# Patient Record
Sex: FEMALE | Race: WHITE | URBAN - METROPOLITAN AREA
[De-identification: names, ages, dates, MRNs, and addresses within clinical notes are randomized per-mention and may not be internally consistent; named-entity substitution may affect disease eponyms.]

---

## 2019-12-26 ENCOUNTER — OFFICE VISIT (OUTPATIENT)
Dept: OBGYN CLINIC | Age: 54
End: 2019-12-26

## 2019-12-26 VITALS
BODY MASS INDEX: 23.64 KG/M2 | WEIGHT: 156 LBS | SYSTOLIC BLOOD PRESSURE: 142 MMHG | HEIGHT: 68 IN | DIASTOLIC BLOOD PRESSURE: 70 MMHG

## 2019-12-26 DIAGNOSIS — L72.3 SEBACEOUS CYST: Primary | ICD-10-CM

## 2019-12-26 RX ORDER — CITALOPRAM 10 MG/1
TABLET ORAL
COMMUNITY
Start: 2019-12-06

## 2019-12-26 NOTE — PROGRESS NOTES
Vaginal lesion evaluation    [place] Park is a 47 y.o. female  Patient's last menstrual period was 12/15/2019 (exact date). who presents with a lesion on her right vaginal/perineal area. She noticed it 10 days ago. The lesion has shown the following change: increasing diameter. No new lesions have developed. She reports the following associated symptoms: pain, swelling. She denies the following associated symptoms: itching, redness, drainage, irritation. Aggravating factors: pressure. Alleviating factors: none. Past Medical History:   Diagnosis Date    Bursitis     Connective tissue disease ([address]     Galactorrhea     Goiter      No past surgical history on file. Social History     Occupational History    Not on file   Tobacco Use    Smoking status: Never Smoker    Smokeless tobacco: Never Used   Substance and Sexual Activity    Alcohol use: Not on file    Drug use: Not on file    Sexual activity: Yes     Partners: Male     Birth control/protection: Surgical     Comment: Partner w. vasectomy     Family History   Problem Relation Age of Onset    Heart Disease Father     Cancer Brother         Testicular       No Known Allergies  Prior to Admission medications    Medication Sig Start Date End Date Taking? Authorizing Provider   citalopram (CELEXA) 10 mg tablet  12/6/19  Yes Provider, Historical   IBUPROFEN PO Take  by mouth.     Provider, Historical        Review of Systems: History obtained from the patient  Constitutional: negative for weight loss, fever, night sweats  GI: negative for change in bowel habits, abdominal pain, black or bloody stools  : negative for frequency, dysuria, hematuria, vaginal discharge  MSK: negative for back pain, joint pain, muscle pain  Skin: negative for itching, rash, hives elsewhere  Neuro: negative for dizziness, headache, confusion, weakness  Psych: negative for anxiety, depression, change in mood  Heme/lymph: negative for bleeding, bruising, pallor    Objective:  Visit Vitals  /70   Ht 5' 8\" (1.727 m)   Wt 156 lb (70.8 kg)   LMP 12/15/2019 (Exact Date)   BMI 23.72 kg/m²       Physical Exam:   PHYSICAL EXAMINATION    Constitutional  · Appearance: well-nourished, well developed, alert, in no acute distress    Gastrointestinal  · Abdominal Examination: abdomen non-tender to palpation, normal bowel sounds, no masses present  · Liver and spleen: no hepatomegaly present, spleen not palpable  · Hernias: no hernias identified    Genitourinary  · External Genitalia: normal appearance for age, no discharge present, no tenderness present, no inflammatory lesions present, no masses present, no atrophy present  · Bladder: non-tender to palpation  · Urethra: appears normal   · Perineum: perineum within normal limits, no evidence of trauma, no rashes or skin lesions present  · Anus: perianal 3 mm sebaceous cyst at 9, otherwise anus within normal limits, no hemorrhoids present  · Inguinal Lymph Nodes: no lymphadenopathy present    Skin  · General Inspection: no rash, no lesions identified    Neurologic/Psychiatric  · Mental Status:  · Orientation: grossly oriented to person, place and time  · Mood and Affect: mood normal, affect appropriate    Assessment:   Sebaceous lesion is not infected    Plan:   Moist heat prn. RTO for AE, pap, mammo, and follow up in 4-6 weeks. RTO prn if symptoms persist or worsen. Instructions given to pt. Handouts given to pt.

## 2023-04-25 ENCOUNTER — TELEPHONE (OUTPATIENT)
Dept: OBGYN CLINIC | Age: 58
End: 2023-04-25